# Patient Record
Sex: MALE | Race: WHITE | NOT HISPANIC OR LATINO | Employment: OTHER | URBAN - METROPOLITAN AREA
[De-identification: names, ages, dates, MRNs, and addresses within clinical notes are randomized per-mention and may not be internally consistent; named-entity substitution may affect disease eponyms.]

---

## 2017-09-20 ENCOUNTER — HOSPITAL ENCOUNTER (EMERGENCY)
Facility: HOSPITAL | Age: 62
Discharge: HOME | End: 2017-09-20
Attending: EMERGENCY MEDICINE
Payer: OTHER GOVERNMENT

## 2017-09-20 VITALS
BODY MASS INDEX: 32.93 KG/M2 | DIASTOLIC BLOOD PRESSURE: 89 MMHG | TEMPERATURE: 99 F | HEIGHT: 70 IN | OXYGEN SATURATION: 97 % | RESPIRATION RATE: 18 BRPM | SYSTOLIC BLOOD PRESSURE: 161 MMHG | WEIGHT: 230 LBS | HEART RATE: 77 BPM

## 2017-09-20 DIAGNOSIS — N28.9 ACUTE ON CHRONIC RENAL INSUFFICIENCY: ICD-10-CM

## 2017-09-20 DIAGNOSIS — I50.9 CONGESTIVE HEART FAILURE, UNSPECIFIED CONGESTIVE HEART FAILURE CHRONICITY, UNSPECIFIED CONGESTIVE HEART FAILURE TYPE: Primary | ICD-10-CM

## 2017-09-20 DIAGNOSIS — N18.9 ACUTE ON CHRONIC RENAL INSUFFICIENCY: ICD-10-CM

## 2017-09-20 LAB
ALBUMIN SERPL BCP-MCNC: 3.5 G/DL
ALP SERPL-CCNC: 119 U/L
ALT SERPL W/O P-5'-P-CCNC: 18 U/L
ANION GAP SERPL CALC-SCNC: 10 MMOL/L
AST SERPL-CCNC: 11 U/L
BASOPHILS # BLD AUTO: 0.04 K/UL
BASOPHILS NFR BLD: 0.5 %
BILIRUB SERPL-MCNC: 1.1 MG/DL
BUN SERPL-MCNC: 43 MG/DL
CALCIUM SERPL-MCNC: 9.2 MG/DL
CHLORIDE SERPL-SCNC: 103 MMOL/L
CO2 SERPL-SCNC: 26 MMOL/L
CREAT SERPL-MCNC: 2.15 MG/DL
DIFFERENTIAL METHOD: ABNORMAL
EOSINOPHIL # BLD AUTO: 0.1 K/UL
EOSINOPHIL NFR BLD: 1.6 %
ERYTHROCYTE [DISTWIDTH] IN BLOOD BY AUTOMATED COUNT: 14.1 %
EST. GFR  (AFRICAN AMERICAN): 37.3 ML/MIN/1.73 M^2
EST. GFR  (NON AFRICAN AMERICAN): 32.3 ML/MIN/1.73 M^2
GLUCOSE SERPL-MCNC: 345 MG/DL
HCT VFR BLD AUTO: 34.6 %
HGB BLD-MCNC: 11.5 G/DL
INR PPP: 1.4
LYMPHOCYTES # BLD AUTO: 0.7 K/UL
LYMPHOCYTES NFR BLD: 9.1 %
MCH RBC QN AUTO: 30.7 PG
MCHC RBC AUTO-ENTMCNC: 33.2 G/DL
MCV RBC AUTO: 92 FL
MONOCYTES # BLD AUTO: 0.5 K/UL
MONOCYTES NFR BLD: 6.7 %
NEUTROPHILS # BLD AUTO: 6.6 K/UL
NEUTROPHILS NFR BLD: 81.7 %
NT-PROBNP: ABNORMAL PG/ML
PLATELET # BLD AUTO: 200 K/UL
PMV BLD AUTO: 11.3 FL
POTASSIUM SERPL-SCNC: 4.1 MMOL/L
PROT SERPL-MCNC: 6.6 G/DL
PROTHROMBIN TIME: 14.9 SEC
RBC # BLD AUTO: 3.75 M/UL
SODIUM SERPL-SCNC: 139 MMOL/L
TROPONIN I SERPL DL<=0.01 NG/ML-MCNC: 0.03 NG/ML
WBC # BLD AUTO: 8.09 K/UL

## 2017-09-20 PROCEDURE — 84484 ASSAY OF TROPONIN QUANT: CPT

## 2017-09-20 PROCEDURE — 63600175 PHARM REV CODE 636 W HCPCS: Performed by: EMERGENCY MEDICINE

## 2017-09-20 PROCEDURE — 96374 THER/PROPH/DIAG INJ IV PUSH: CPT

## 2017-09-20 PROCEDURE — 93005 ELECTROCARDIOGRAM TRACING: CPT

## 2017-09-20 PROCEDURE — 93010 ELECTROCARDIOGRAM REPORT: CPT | Mod: ,,, | Performed by: INTERNAL MEDICINE

## 2017-09-20 PROCEDURE — 83880 ASSAY OF NATRIURETIC PEPTIDE: CPT

## 2017-09-20 PROCEDURE — 85025 COMPLETE CBC W/AUTO DIFF WBC: CPT

## 2017-09-20 PROCEDURE — 94640 AIRWAY INHALATION TREATMENT: CPT

## 2017-09-20 PROCEDURE — 94760 N-INVAS EAR/PLS OXIMETRY 1: CPT

## 2017-09-20 PROCEDURE — 99285 EMERGENCY DEPT VISIT HI MDM: CPT | Mod: 25

## 2017-09-20 PROCEDURE — 85610 PROTHROMBIN TIME: CPT

## 2017-09-20 PROCEDURE — 80053 COMPREHEN METABOLIC PANEL: CPT

## 2017-09-20 PROCEDURE — 25000242 PHARM REV CODE 250 ALT 637 W/ HCPCS: Performed by: EMERGENCY MEDICINE

## 2017-09-20 RX ORDER — HYDROCODONE BITARTRATE AND ACETAMINOPHEN 5; 325 MG/1; MG/1
1 TABLET ORAL EVERY 6 HOURS PRN
COMMUNITY

## 2017-09-20 RX ORDER — MONTELUKAST SODIUM 4 MG/1
1 TABLET, CHEWABLE ORAL 2 TIMES DAILY
COMMUNITY

## 2017-09-20 RX ORDER — ALBUTEROL SULFATE 0.83 MG/ML
2.5 SOLUTION RESPIRATORY (INHALATION)
Status: COMPLETED | OUTPATIENT
Start: 2017-09-20 | End: 2017-09-20

## 2017-09-20 RX ORDER — NITROGLYCERIN 0.4 MG/1
0.4 TABLET SUBLINGUAL EVERY 5 MIN PRN
COMMUNITY

## 2017-09-20 RX ORDER — DIPHENHYDRAMINE HCL 25 MG
25 CAPSULE ORAL EVERY 6 HOURS PRN
COMMUNITY

## 2017-09-20 RX ORDER — INSULIN GLARGINE 100 [IU]/ML
INJECTION, SOLUTION SUBCUTANEOUS NIGHTLY
COMMUNITY

## 2017-09-20 RX ORDER — FUROSEMIDE 10 MG/ML
40 INJECTION INTRAMUSCULAR; INTRAVENOUS
Status: COMPLETED | OUTPATIENT
Start: 2017-09-20 | End: 2017-09-20

## 2017-09-20 RX ORDER — INSULIN ASPART 100 [IU]/ML
INJECTION, SOLUTION INTRAVENOUS; SUBCUTANEOUS
COMMUNITY

## 2017-09-20 RX ADMIN — ALBUTEROL SULFATE 2.5 MG: 2.5 SOLUTION RESPIRATORY (INHALATION) at 08:09

## 2017-09-20 RX ADMIN — FUROSEMIDE 40 MG: 10 INJECTION, SOLUTION INTRAMUSCULAR; INTRAVENOUS at 08:09

## 2017-09-20 NOTE — ED PROVIDER NOTES
"Encounter Date: 9/20/2017       History     Chief Complaint   Patient presents with    Shortness of Breath     Pt arrived per EMS with c/o SOB x 3 days, reports increased SOB with exertion and at rest.       Patient presents with increasing shortness breath over the past few days.  He reports that he states some weight too.  He has a history of congestive heart failure, MI and CABG.  He reports no fever chills nausea nausea or vomiting.  He also denies chest pain.  Shortness breath is worse with exertion and better at rest.  He is also a smoker.  He denies cough.          Review of patient's allergies indicates:   Allergen Reactions    Avelox [moxifloxacin]     Tramadol Other (See Comments)     "felt bad"     Past Medical History:   Diagnosis Date    Cancer     BLADDER    CHF (congestive heart failure)     Chronic pain     Coronary artery disease     Diabetes mellitus     Heart attack     Heart failure     Hypertension     Mental disorder     DEPRESSION    Mental disorder     ANXIETY     Past Surgical History:   Procedure Laterality Date    BLADDER CANCER      REMOVAL    CARDIAC BYPASS      CARDIAC DEFIBRILLATOR PLACEMENT Left     TONSILLECTOMY       Family History   Problem Relation Age of Onset    Diabetes Mother     Cancer Mother      Pancreas     Social History   Substance Use Topics    Smoking status: Current Every Day Smoker     Packs/day: 1.00     Years: 40.00     Types: Cigarettes    Smokeless tobacco: Never Used    Alcohol use No     Review of Systems   Constitutional: Negative for fever.   HENT: Negative for sore throat.    Respiratory: Positive for shortness of breath. Negative for cough, chest tightness, wheezing and stridor.    Cardiovascular: Negative for chest pain.   Gastrointestinal: Negative for nausea.   Genitourinary: Negative for dysuria.   Musculoskeletal: Negative for back pain.   Skin: Negative for rash.   Neurological: Negative for weakness.   Hematological: Does not " bruise/bleed easily.       Physical Exam     Initial Vitals [09/20/17 0823]   BP Pulse Resp Temp SpO2   (!) 165/85 80 (!) 24 98.6 °F (37 °C) 98 %      MAP       111.67         Physical Exam    Nursing note and vitals reviewed.  Constitutional: He appears well-developed and well-nourished.   HENT:   Head: Normocephalic and atraumatic.   Nose: Nose normal.   Mouth/Throat: Oropharynx is clear and moist.   Eyes: Conjunctivae and EOM are normal.   Neck: Normal range of motion. Neck supple.   Cardiovascular: Normal rate, regular rhythm, normal heart sounds and intact distal pulses.   No pedal edema noted   Pulmonary/Chest: Breath sounds normal.   Abdominal: Soft. Bowel sounds are normal.   Musculoskeletal: Normal range of motion.   Neurological: He is alert and oriented to person, place, and time. He has normal strength.   Skin: Skin is warm and dry.   Psychiatric: He has a normal mood and affect. Thought content normal.         ED Course   Procedures  Labs Reviewed   CBC W/ AUTO DIFFERENTIAL - Abnormal; Notable for the following:        Result Value    RBC 3.75 (*)     Hemoglobin 11.5 (*)     Hematocrit 34.6 (*)     Lymph # 0.7 (*)     Gran% 81.7 (*)     Lymph% 9.1 (*)     All other components within normal limits   COMPREHENSIVE METABOLIC PANEL - Abnormal; Notable for the following:     Glucose 345 (*)     BUN, Bld 43 (*)     Creatinine 2.15 (*)     Total Bilirubin 1.1 (*)     AST 11 (*)     eGFR if  37.3 (*)     eGFR if non  32.3 (*)     All other components within normal limits   PROTIME-INR - Abnormal; Notable for the following:     Prothrombin Time 14.9 (*)     INR 1.4 (*)     All other components within normal limits   NT-PRO NATRIURETIC PEPTIDE - Abnormal; Notable for the following:     NT-proBNP 96216 (*)     All other components within normal limits   TROPONIN I     EKG Readings: (Independently Interpreted)   Initial Reading: No STEMI. Rhythm: Normal Sinus Rhythm. Heart Rate: 77.  Conduction: LBBB. Other Impression: No STEMI            Imaging Results          X-Ray Chest AP Portable (Final result)  Result time 09/20/17 08:47:29    Final result by Anuja Palomares MD (09/20/17 08:47:29)                 Impression:      Cardiomegaly with mild vascular prominence.  Possible low-grade CHF.      Electronically signed by: ANUJA PALOMARES MD  Date:     09/20/17  Time:    08:47              Narrative:    Exam: XR CHEST AP PORTABLE,    Date:  09/20/17 08:30:05    History: CHF    Comparison:  05/18/2016.    Findings: Sternal wires and mediastinal clips.  Left pacemaker.  Multiple wire leads overlie the chest    Cardiomegaly.  Mild vascular prominence.  Possible low-grade CHF.                              Medical Decision Making:   Differential Diagnosis:   Heart failure, pneumonia, infectious etiology, pneumothorax, pulmonary embolism, MI  ED Management:  11 AM patient is doing a lot better, he is no longer short of breath, he urinated almost a liter in the emergency room.  Plan is to double his Lasix for the next today.  He does have a history of renal insufficiency and [facility]                    ED Course      Clinical Impression:   The primary encounter diagnosis was Congestive heart failure, unspecified congestive heart failure chronicity, unspecified congestive heart failure type. A diagnosis of Acute on chronic renal insufficiency was also pertinent to this visit.    Disposition:   Disposition: Discharged  Condition: Stable                        Fabrizio Evangelista Do, MD  09/20/17 0422

## 2017-09-20 NOTE — DISCHARGE INSTRUCTIONS
Double your Lasix for the next 2 days.  Return for fever, vomiting, any worsening shortness of breath or for any concerns at all